# Patient Record
(demographics unavailable — no encounter records)

---

## 2025-04-16 NOTE — PHYSICAL EXAM
[No Acute Distress] : no acute distress [Well Nourished] : well nourished [Well Developed] : well developed [Well-Appearing] : well-appearing [Normal Sclera/Conjunctiva] : normal sclera/conjunctiva [PERRL] : pupils equal round and reactive to light [Normal Outer Ear/Nose] : the outer ears and nose were normal in appearance [Normal TMs] : both tympanic membranes were normal [No JVD] : no jugular venous distention [No Lymphadenopathy] : no lymphadenopathy [Supple] : supple [Thyroid Normal, No Nodules] : the thyroid was normal and there were no nodules present [No Respiratory Distress] : no respiratory distress  [No Accessory Muscle Use] : no accessory muscle use [Clear to Auscultation] : lungs were clear to auscultation bilaterally [Normal Rate] : normal rate  [Regular Rhythm] : with a regular rhythm [Normal S1, S2] : normal S1 and S2 [No Murmur] : no murmur heard [No Carotid Bruits] : no carotid bruits [No Varicosities] : no varicosities [Pedal Pulses Present] : the pedal pulses are present [No Edema] : there was no peripheral edema [No Extremity Clubbing/Cyanosis] : no extremity clubbing/cyanosis [Soft] : abdomen soft [Non Tender] : non-tender [Non-distended] : non-distended [No Masses] : no abdominal mass palpated [No HSM] : no HSM [Normal Bowel Sounds] : normal bowel sounds [Normal Posterior Cervical Nodes] : no posterior cervical lymphadenopathy [Normal Anterior Cervical Nodes] : no anterior cervical lymphadenopathy [No CVA Tenderness] : no CVA  tenderness [No Spinal Tenderness] : no spinal tenderness [No Joint Swelling] : no joint swelling [Grossly Normal Strength/Tone] : grossly normal strength/tone [No Rash] : no rash [Coordination Grossly Intact] : coordination grossly intact [No Focal Deficits] : no focal deficits [Normal Gait] : normal gait [Normal Affect] : the affect was normal [Alert and Oriented x3] : oriented to person, place, and time [Normal Insight/Judgement] : insight and judgment were intact [de-identified] : Class Iv pharynx.

## 2025-04-16 NOTE — HISTORY OF PRESENT ILLNESS
[(Patient denies any chest pain, claudication, dyspnea on exertion, orthopnea, palpitations or syncope)] : Patient denies any chest pain, claudication, dyspnea on exertion, orthopnea, palpitations or syncope [Good (7-10 METs)] : Good (7-10 METs) [Aortic Stenosis] : no aortic stenosis [Atrial Fibrillation] : no atrial fibrillation [Coronary Artery Disease] : no coronary artery disease [Recent Myocardial Infarction] : no recent myocardial infarction [Implantable Device/Pacemaker] : no implantable device/pacemaker [Asthma] : no asthma [COPD] : no COPD [Sleep Apnea] : no sleep apnea [Smoker] : not a smoker [Self] : no previous adverse anesthesia reaction [Chronic Anticoagulation] : no chronic anticoagulation [Chronic Kidney Disease] : no chronic kidney disease [Diabetes] : no diabetes [FreeTextEntry2] : 4/25/25 [FreeTextEntry1] : Excision Right buttock mass  [FreeTextEntry3] : Dr. Ceferino Flores [FreeTextEntry4] : Ms. JEFFREY BACK is a 43 year-old female presents today for pre op evaluation

## 2025-04-16 NOTE — ASSESSMENT
[Patient NOT optimized for Surgery at this time] : Patient not optimized for surgery at this time [As per surgery] : as per surgery [Patient Optimized for Surgery] : Patient optimized for surgery [FreeTextEntry4] : # Pre Op Clearance  - EKG - Sinus rhythm, nonspecific T changes  - Labs CBC/BMP/Coag/HCG - pending  - BP at goal - Patient RCI score Class 1 Risk

## 2025-04-16 NOTE — HISTORY OF PRESENT ILLNESS
[(Patient denies any chest pain, claudication, dyspnea on exertion, orthopnea, palpitations or syncope)] : Patient denies any chest pain, claudication, dyspnea on exertion, orthopnea, palpitations or syncope [Good (7-10 METs)] : Good (7-10 METs) [Aortic Stenosis] : no aortic stenosis [Atrial Fibrillation] : no atrial fibrillation [Coronary Artery Disease] : no coronary artery disease [Recent Myocardial Infarction] : no recent myocardial infarction [Implantable Device/Pacemaker] : no implantable device/pacemaker [Asthma] : no asthma [COPD] : no COPD [Sleep Apnea] : no sleep apnea [Smoker] : not a smoker [Self] : no previous adverse anesthesia reaction [Chronic Anticoagulation] : no chronic anticoagulation [Chronic Kidney Disease] : no chronic kidney disease [Diabetes] : no diabetes [FreeTextEntry2] : 4/25/25 [FreeTextEntry1] : Excision Right buttock mass  [FreeTextEntry3] : Dr. Ceferino Flores [FreeTextEntry4] : Ms. JEFFREY BACK is a 43 year-old female presents today for pre op evaluation yes

## 2025-04-16 NOTE — PHYSICAL EXAM
[No Acute Distress] : no acute distress [Well Nourished] : well nourished [Well Developed] : well developed [Well-Appearing] : well-appearing [Normal Sclera/Conjunctiva] : normal sclera/conjunctiva [PERRL] : pupils equal round and reactive to light [Normal Outer Ear/Nose] : the outer ears and nose were normal in appearance [Normal TMs] : both tympanic membranes were normal [No JVD] : no jugular venous distention [No Lymphadenopathy] : no lymphadenopathy [Supple] : supple [Thyroid Normal, No Nodules] : the thyroid was normal and there were no nodules present [No Respiratory Distress] : no respiratory distress  [No Accessory Muscle Use] : no accessory muscle use [Clear to Auscultation] : lungs were clear to auscultation bilaterally [Normal Rate] : normal rate  [Regular Rhythm] : with a regular rhythm [Normal S1, S2] : normal S1 and S2 [No Murmur] : no murmur heard [No Carotid Bruits] : no carotid bruits [No Varicosities] : no varicosities [Pedal Pulses Present] : the pedal pulses are present [No Edema] : there was no peripheral edema [No Extremity Clubbing/Cyanosis] : no extremity clubbing/cyanosis [Soft] : abdomen soft [Non Tender] : non-tender [Non-distended] : non-distended [No Masses] : no abdominal mass palpated [No HSM] : no HSM [Normal Bowel Sounds] : normal bowel sounds [Normal Posterior Cervical Nodes] : no posterior cervical lymphadenopathy [Normal Anterior Cervical Nodes] : no anterior cervical lymphadenopathy [No CVA Tenderness] : no CVA  tenderness [No Spinal Tenderness] : no spinal tenderness [No Joint Swelling] : no joint swelling [Grossly Normal Strength/Tone] : grossly normal strength/tone [No Rash] : no rash [Coordination Grossly Intact] : coordination grossly intact [No Focal Deficits] : no focal deficits [Normal Gait] : normal gait [Normal Affect] : the affect was normal [Alert and Oriented x3] : oriented to person, place, and time [Normal Insight/Judgement] : insight and judgment were intact [de-identified] : Class Iv pharynx.

## 2025-04-17 NOTE — PHYSICAL EXAM
[Chaperoned Physical Exam] : A chaperone was present in the examining room during all aspects of the physical examination. [FreeTextEntry2] : Kiersten

## 2025-04-17 NOTE — HISTORY OF PRESENT ILLNESS
[FreeTextEntry1] : Pleasant 43-year-old female presents with 1-1/2-month history of pelvic cramping/aching, vaginal dryness and urethral burning.  Patient reports documented UTI about a month ago treated with 5-day course of nitrofurantoin however pelvic discomfort and above symptoms persisted.  Received another course of antibiotics ampicillin and then azithromycin but still with no relief of discomfort.  The patient was reevaluated by GYN and there was no evidence of vaginal infection or STD.  Patient currently complains of above symptoms and also does describe some continued dysuria.  The patient has no irritative or obstructive symptoms.  There is no history of gross hematuria or nephrolithiasis.  The patient does have a history of genital herpes and was empirically treated with a course of valacyclovir however the patient has never noted any specific outbreaks and the valacyclovir did not improve symptoms.  Uribel was helpful but did cause some GI distress and was discontinued.  The patient is currently on no antibiotics x 2 weeks.  The patient underwent CT scan of the abdomen pelvis with contrast which showed no urological abnormalities.  There was a moderate enlargement of right external iliac node which will be followed by her medical doctor.  There was rectus diastases identified and scattered colonic diverticuli present but no evidence of acute diverticulitis.  Also possible fibroid uterus.  The patient's past medical history past surgical history and review of systems were reviewed today and can be found the patient's electronic medical record.  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  Physical examination shows the patient to be alert oriented x 3 neuro grossly intact normal gait  Abdominal exam shows central obesity no distention, tympany, masses, organomegaly.  PVR by bladder scan was about 13 cc.  There is no suprapubic or ilioinguinal tenderness found.  Pelvic examination showed no vaginal discharge.  Introital tissue appeared normal to inspection.  Q-tip tenderness was initially not present but towards the end of examination the patient noted a great deal of vulvar burning and most of the discomfort appeared to occur in the region between the clitoris and the 12 o'clock position of the urethral meatus.  No lesions were seen in that region but pain could be reproduced by palpation.  Otherwise palpation along the urethral course bladder neck and bladder fundus failed to produce any pain whatsoever.  The pelvic floor was normal to inspection supple without trigger points.  In summary most of the patient's discomfort appears to be introital based and reinspection suggests some dryness in this area and this coincides with the patient's current complaints.  To that end I suggested moving forward with topical estradiol preparation as per protocol per fingertip.  Also suggested a short trial of topical lidocaine in that region to see if this might improve patient's symptoms and she was given a 2% lidocaine injector for this.  Urine will be sent for analysis and culture today and if symptoms are not improving we elected to move forward with cystoscopy on her next visit in 6 to 8 weeks.  The patient was counseled on all the risks and benefits regarding estradiol preparation and the need to continue using it topically for at least 4 to 6 weeks to see any significant results.  In the meantime, the patient will be retrieving her cultures from her referring doctor's office for my review.

## 2025-06-17 NOTE — HISTORY OF PRESENT ILLNESS
[FreeTextEntry1] : Pleasant 43-year-old female presents with 1-1/2-month history of pelvic cramping/aching, vaginal dryness and urethral burning.  Patient reports documented UTI about a month ago treated with 5-day course of nitrofurantoin however pelvic discomfort and above symptoms persisted.  Received another course of antibiotics ampicillin and then azithromycin but still with no relief of discomfort.  The patient was reevaluated by GYN and there was no evidence of vaginal infection or STD.  Patient currently complains of above symptoms and also does describe some continued dysuria.  The patient has no irritative or obstructive symptoms.  There is no history of gross hematuria or nephrolithiasis.  The patient does have a history of genital herpes and was empirically treated with a course of valacyclovir however the patient has never noted any specific outbreaks and the valacyclovir did not improve symptoms.  Uribel was helpful but did cause some GI distress and was discontinued.  The patient is currently on no antibiotics x 2 weeks.  The patient underwent CT scan of the abdomen pelvis with contrast which showed no urological abnormalities.  There was a moderate enlargement of right external iliac node which will be followed by her medical doctor.  There was rectus diastases identified and scattered colonic diverticuli present but no evidence of acute diverticulitis.  Also possible fibroid uterus.  The patient's past medical history past surgical history and review of systems were reviewed today and can be found the patient's electronic medical record.  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  Physical examination shows the patient to be alert oriented x 3 neuro grossly intact normal gait  Abdominal exam shows central obesity no distention, tympany, masses, organomegaly.  PVR by bladder scan was about 13 cc.  There is no suprapubic or ilioinguinal tenderness found.  Pelvic examination showed no vaginal discharge.  Introital tissue appeared normal to inspection.  Q-tip tenderness was initially not present but towards the end of examination the patient noted a great deal of vulvar burning and most of the discomfort appeared to occur in the region between the clitoris and the 12 o'clock position of the urethral meatus.  No lesions were seen in that region but pain could be reproduced by palpation.  Otherwise palpation along the urethral course bladder neck and bladder fundus failed to produce any pain whatsoever.  The pelvic floor was normal to inspection supple without trigger points.  In summary most of the patient's discomfort appears to be introital based and reinspection suggests some dryness in this area and this coincides with the patient's current complaints.  To that end I suggested moving forward with topical estradiol preparation as per protocol per fingertip.  Also suggested a short trial of topical lidocaine in that region to see if this might improve patient's symptoms and she was given a 2% lidocaine injector for this.  Urine will be sent for analysis and culture today and if symptoms are not improving we elected to move forward with cystoscopy on her next visit in 6 to 8 weeks.  The patient was counseled on all the risks and benefits regarding estradiol preparation and the need to continue using it topically for at least 4 to 6 weeks to see any significant results.  In the meantime, the patient will be retrieving her cultures from her referring doctor's office for my review.  6/17/25: Report vaginal burning, dryness, and irritation improved. NO bothersome urinary issue. Have NOT been sexually active since last visit.  Last UTI 1.5mon ago.  Have NOT used Estrace or Lidocaine topical, due to normal hormone panel done with GYN.  Seen [facility] doctor, given probiotics and vaginal suppositories (EHB - antibacterial herb), with hydration, started 1.5wks ago.  Cysto done with Uro Dr. Bauman in 5/2025, unremarkable.  Last UCX 5/27/25 with Dr. Spears, (+) Ureaplasma urealyticum and Ureaplasma parvum. Given Azithromycin x6d.

## 2025-06-17 NOTE — HISTORY OF PRESENT ILLNESS
[FreeTextEntry1] : Pleasant 43-year-old female presents with 1-1/2-month history of pelvic cramping/aching, vaginal dryness and urethral burning.  Patient reports documented UTI about a month ago treated with 5-day course of nitrofurantoin however pelvic discomfort and above symptoms persisted.  Received another course of antibiotics ampicillin and then azithromycin but still with no relief of discomfort.  The patient was reevaluated by GYN and there was no evidence of vaginal infection or STD.  Patient currently complains of above symptoms and also does describe some continued dysuria.  The patient has no irritative or obstructive symptoms.  There is no history of gross hematuria or nephrolithiasis.  The patient does have a history of genital herpes and was empirically treated with a course of valacyclovir however the patient has never noted any specific outbreaks and the valacyclovir did not improve symptoms.  Uribel was helpful but did cause some GI distress and was discontinued.  The patient is currently on no antibiotics x 2 weeks.  The patient underwent CT scan of the abdomen pelvis with contrast which showed no urological abnormalities.  There was a moderate enlargement of right external iliac node which will be followed by her medical doctor.  There was rectus diastases identified and scattered colonic diverticuli present but no evidence of acute diverticulitis.  Also possible fibroid uterus.  The patient's past medical history past surgical history and review of systems were reviewed today and can be found the patient's electronic medical record.  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  Physical examination shows the patient to be alert oriented x 3 neuro grossly intact normal gait  Abdominal exam shows central obesity no distention, tympany, masses, organomegaly.  PVR by bladder scan was about 13 cc.  There is no suprapubic or ilioinguinal tenderness found.  Pelvic examination showed no vaginal discharge.  Introital tissue appeared normal to inspection.  Q-tip tenderness was initially not present but towards the end of examination the patient noted a great deal of vulvar burning and most of the discomfort appeared to occur in the region between the clitoris and the 12 o'clock position of the urethral meatus.  No lesions were seen in that region but pain could be reproduced by palpation.  Otherwise palpation along the urethral course bladder neck and bladder fundus failed to produce any pain whatsoever.  The pelvic floor was normal to inspection supple without trigger points.  In summary most of the patient's discomfort appears to be introital based and reinspection suggests some dryness in this area and this coincides with the patient's current complaints.  To that end I suggested moving forward with topical estradiol preparation as per protocol per fingertip.  Also suggested a short trial of topical lidocaine in that region to see if this might improve patient's symptoms and she was given a 2% lidocaine injector for this.  Urine will be sent for analysis and culture today and if symptoms are not improving we elected to move forward with cystoscopy on her next visit in 6 to 8 weeks.  The patient was counseled on all the risks and benefits regarding estradiol preparation and the need to continue using it topically for at least 4 to 6 weeks to see any significant results.  In the meantime, the patient will be retrieving her cultures from her referring doctor's office for my review.  6/17/25: Report vaginal burning, dryness, and irritation improved. NO bothersome urinary issue. Have NOT been sexually active since last visit.  Last UTI 1.5mon ago.  Have NOT used Estrace or Lidocaine topical, due to normal hormone panel done with GYN.  Seen [facility] doctor, given probiotics and vaginal suppositories (EHB - antibacterial herb), with hydration, started 1.5wks ago.  Cysto done with Uro Dr. Bauman in 5/2025, unremarkable.  Last UCX 5/27/25 with Dr. Spears, (+) Ureaplasma urealyticum and Ureaplasma parvum. Given Azithromycin x6d.